# Patient Record
Sex: MALE | Race: OTHER | HISPANIC OR LATINO | Employment: FULL TIME | URBAN - METROPOLITAN AREA
[De-identification: names, ages, dates, MRNs, and addresses within clinical notes are randomized per-mention and may not be internally consistent; named-entity substitution may affect disease eponyms.]

---

## 2024-11-19 ENCOUNTER — ANCILLARY PROCEDURE (OUTPATIENT)
Dept: URGENT CARE | Age: 29
End: 2024-11-19
Payer: COMMERCIAL

## 2024-11-19 ENCOUNTER — OFFICE VISIT (OUTPATIENT)
Dept: URGENT CARE | Age: 29
End: 2024-11-19
Payer: COMMERCIAL

## 2024-11-19 VITALS
WEIGHT: 280.2 LBS | SYSTOLIC BLOOD PRESSURE: 128 MMHG | HEIGHT: 72 IN | OXYGEN SATURATION: 98 % | RESPIRATION RATE: 22 BRPM | HEART RATE: 96 BPM | DIASTOLIC BLOOD PRESSURE: 93 MMHG | BODY MASS INDEX: 37.95 KG/M2 | TEMPERATURE: 99 F

## 2024-11-19 DIAGNOSIS — R68.89 FLU-LIKE SYMPTOMS: Primary | ICD-10-CM

## 2024-11-19 DIAGNOSIS — Z20.828 EXPOSURE TO SARS VIRUS: ICD-10-CM

## 2024-11-19 DIAGNOSIS — Z20.822 COVID-19 RULED OUT: ICD-10-CM

## 2024-11-19 DIAGNOSIS — R05.1 ACUTE COUGH: ICD-10-CM

## 2024-11-19 DIAGNOSIS — U07.1 COVID: ICD-10-CM

## 2024-11-19 LAB
POC RAPID INFLUENZA A: NEGATIVE
POC RAPID INFLUENZA B: NEGATIVE
POC SARS-COV-2 AG BINAX: ABNORMAL

## 2024-11-19 PROCEDURE — 71046 X-RAY EXAM CHEST 2 VIEWS: CPT | Performed by: EMERGENCY MEDICINE

## 2024-11-19 ASSESSMENT — ENCOUNTER SYMPTOMS
COUGH: 1
FEVER: 1
SHORTNESS OF BREATH: 1
SINUS COMPLAINT: 1
VOMITING: 1
SORE THROAT: 1
HEADACHES: 1

## 2024-11-19 ASSESSMENT — PAIN SCALES - GENERAL: PAINLEVEL_OUTOF10: 7

## 2024-11-19 NOTE — LETTER
November 19, 2024     Patient: Dillon Cartwright   YOB: 1995   Date of Visit: 11/19/2024       To Whom It May Concern:    It is my medical opinion that Dillon Cartwright may return to work on 11/22/2024 .    If you have any questions or concerns, please don't hesitate to call.         Sincerely,        Arturo Horne,     CC: No Recipients

## 2024-11-19 NOTE — PROGRESS NOTES
Subjective   Patient ID: Dillon Cartwright is a 29 y.o. male. They present today with a chief complaint of Cough, Illness (X 2 d.), Nasal Congestion, Headache, Sinus Problem, Sore Throat, URI, Rash, Vomiting, Fever, Shortness of Breath, and Request For Covid Testing.    History of Present Illness    Cough  Associated symptoms include a fever, headaches, a rash, a sore throat and shortness of breath.   Illness  Associated symptoms: cough, fever, headaches, rash, shortness of breath, sore throat and vomiting    Headache  Associated symptoms: cough, fever, sore throat, URI and vomiting    Sinus Problem  Associated symptoms: cough, fever, headaches, rash, shortness of breath, sore throat and vomiting    Sore Throat   Associated symptoms include coughing, headaches, shortness of breath and vomiting.   URI  Presenting symptoms: cough, fever and sore throat    Associated symptoms: headaches    Rash  Associated symptoms include coughing, a fever, shortness of breath, a sore throat and vomiting.   Vomiting  Associated symptoms: cough, fever, headaches, sore throat and URI    Fever   Associated symptoms include coughing, headaches, a rash, a sore throat and vomiting.   Shortness of Breath  Associated symptoms: cough, fever, headaches, rash, sore throat and vomiting      This is a 29-year-old male who presents today complaining of cough for the past 2 days with associated nasal congestion.  The patient also complained of some mild headache and sore throat.  He admits to low-grade fever and intermittent shortness of breath.  He denies any nausea.  He denies any abdominal pain.  Past Medical History  Allergies as of 11/19/2024    (No Known Allergies)       (Not in a hospital admission)       History reviewed. No pertinent past medical history.    History reviewed. No pertinent surgical history.     reports that he does not currently use alcohol.    Review of Systems  Review of Systems   Constitutional:  Positive for fever.   HENT:   Positive for sore throat.    Respiratory:  Positive for cough and shortness of breath.    Gastrointestinal:  Positive for vomiting.   Skin:  Positive for rash.   Neurological:  Positive for headaches.                                  Objective    Vitals:    11/19/24 0856   BP: (!) 128/93   BP Location: Left arm   Patient Position: Sitting   BP Cuff Size: Large adult   Pulse: 96   Resp: 22   Temp: 37.2 °C (99 °F)   TempSrc: Oral   SpO2: 98%   Weight: 127 kg (280 lb 3.2 oz)   Height: 1.829 m (6')     No LMP for male patient.    Physical Exam  Patient is awake alert oriented x 3 in no acute distress vital signs are stable.  He is afebrile.  Neck supple.  Nontoxic-appearing.  Airways patent.  Throat nonerythematous.  EACs were patent.  Cardiovascular is regular rate and rhythm.  Lungs are clear but diminished.  Procedures    Point of Care Test & Imaging Results from this visit  Results for orders placed or performed in visit on 11/19/24   POCT BinaxNOW Covid-19 Ag Card manually resulted   Result Value Ref Range    POC YNES-COV-2 AG Positive test for SARS-CoV-2 (antigen detected) (A) Presumptive negative test for SARS-CoV-2 (no antigen detected)   POCT Influenza A/B manually resulted   Result Value Ref Range    POC Rapid Influenza A Negative Negative    POC Rapid Influenza B Negative Negative      XR chest 2 views    Result Date: 11/19/2024  Interpreted By:  Servando Rodgers, STUDY: XR CHEST 2 VIEWS;  11/19/2024 9:12 am   INDICATION: Signs/Symptoms:cough. ,R05.1 Acute cough   COMPARISON: None.   ACCESSION NUMBER(S): [id number]   ORDERING CLINICIAN: ZURDO RODRIGUEZ   TECHNIQUE: PA and lateral views of the chest were obtained.   FINDINGS: MEDIASTINUM/LUNGS/RADHIKA: No cardiomegaly, vascular congestion, or pleural effusion. No abnormal opacity in either lung worrisome for tumor or pneumonia. No pneumothorax. No tracheal deviation. No abnormal hilar fullness or gross mass on either side.   BONES: No lytic or blastic  destructive bone lesion.   UPPER ABDOMEN: Grossly intact.       Negative exam.   MACRO: None   Signed by: Servando Rodgers 11/19/2024 9:33 AM Dictation workstation:   [id number]     Diagnostic study results (if any) were reviewed by Arturo Horne DO.    Assessment/Plan   Allergies, medications, history, and pertinent labs/EKGs/Imaging reviewed by Arturo Horne DO.     Medical Decision Making  #1 rule out COVID #2 rule out flu #3 rule out pneumonia    Orders and Diagnoses  Diagnoses and all orders for this visit:  Flu-like symptoms  -     POCT Influenza A/B manually resulted  COVID  COVID-19 ruled out  Exposure to SARS virus  -     POCT BinaxNOW Covid-19 Ag Card manually resulted  Acute cough  -     POCT BinaxNOW Covid-19 Ag Card manually resulted  -     XR chest 2 views; Future      Medical Admin Record      Patient disposition: Home    Electronically signed by Arturo Horne DO  9:36 AM